# Patient Record
(demographics unavailable — no encounter records)

---

## 2025-02-06 NOTE — ASSESSMENT
[FreeTextEntry1] : JASON is a 3 year old girl presenting for sleep disordered breathing  Sleep Disordered Breathing - Discussed options for observation, medical management, sleep study or surgery.  - family would like to proceed with flonase trial and sleep study - offered T&A [facility] outpatient due to presumed symptom severity  left level 5 lymph node likely reactive 1x2cm mobile reassurance   speech  - reassurance   Education provided: Sleep disordered breathing may indicate presence of Obstructive Sleep Apnea. Obstructive sleep apnea is a condition where there are there is a cessation of breathing due to upper airway obstruction during sleep. It can be caused by a number of anatomic issues including, but not limited to tonsillar hypertrophy, increased weight, nasal obstruction and airway issues. There can be snoring, but this may be normal. Sleep apnea can be suggested by history, but a sleep study is needed to diagnose it. Options include observation and correction of the anatomic abnormality, weight loss if weight is contributory.   Sleep study If a sleep study was ordered, it will be used to evaluate for obstructive sleep apnea given history of snoring and other symptoms consistent with sleep-disordered breathing as mentioned above.   T&A Consent for Tonsillectomy and Adenoidectomy The risks, benefits and alternatives of tonsillectomy and adenoidectomy were discussed.   The risks of tonsillectomy include but are not limited to: bleeding, which can range from mild requiring observation to more serious or life-threatening bleeding necessitating hospitalization, blood transfusion, and/or return to the operating room for control; voice change, infection, pain, dehydration, swallowing difficulty, need for additional surgery, nasal regurgitation and risk of anesthesia (which will be discussed by the anesthesiologist). Benefits in the case of recurrent tonsillopharyngitis include a reduction (but not necessarily a complete cure) in the number of throat infections, and in the case of obstructive sleep apnea (MONTSERRAT) include a decrease in severity of MONTSERRAT, which can be curative, but in many cases residual MONTSERRAT may occur. Alternatives in the case of recurrent tonsillopharyngitis include observation and continued antibiotic treatment, and in the case of MONTSERRAT observation, medical therapy, Continuous Positive Airway Pressure(CPAP), Bilevel Positive Airway Pressure (BiPAP) other surgical options. Non-treatment of MONTSERRAT is associated with decreased sleep and its sequelae, and in severe cases can have cardiovascular complications.  The risks, benefits and alternatives of adenoidectomy were discussed. The risks include but are not limited to: bleeding, which can range from mild requiring observation to more serious necessitating hospitalization, blood transfusion, return to the operating room for control and in extreme cases death; voice change- specifically velopharyngeal insufficiency which can affect the nasal resonance; infection, pain, dehydration, swallowing difficulty, need for additional surgery, nasal regurgitation and risk of anesthesia (which will be discussed by the anesthesiologist). Benefits in the case of recurrent adenoiditis include a reduction (but not necessarily a complete cure) in the number of adenoid infections; in the case of nasal obstruction an improvement of nasal airway and decreased rhinorrhea; and in the case of obstructive sleep apnea (MONTSERRAT) include a decrease in severity of MONTSERRAT, which can be curative, but in many cases residual MONTSERRAT may occur. Alternatives in the case of recurrent adenoiditis include observation and continued antibiotic treatment; in the case of nasal obstruction observation or medical therapy including but not limited to antihistamines, intranasal/systemic steroids; and in the case of MONTSERRAT observation, medical therapy, Continuous Positive Airway Pressure(CPAP), Bilevel Positive Airway Pressure (BiPAP) other surgical options. Non-treatment of MONTSERRAT is associated with decreased sleep and its sequelae, and in severe cases can have cardiovascular complications.   Options/risks/benefits for intracapsular vs extracapsular tonsillectomy were discussed with the family.

## 2025-02-06 NOTE — HISTORY OF PRESENT ILLNESS
[Hyperactivity] : hyperactivity [No Personal or Family History of Easy Bruising, Bleeding, or Issues with General Anesthesia] : No Personal or Family History of easy bruising, bleeding, or issues with general anesthesia [de-identified] : Today I had the pleasure of seeing JASON ROBERTS for new patient evaluation.  JASON is a 3 year old girl who presents for: mouth breathing and snoring History was obtained from patient, father and grandmother and chart.   here for mouth breathing, snoring.  Snoring loudly going on for a long time, snoring most nights, stable, denies witnessed apneas/gasping, denies daytime fatigue chronic nasal congestion, frequent illness, denies daytime allergy symptoms other than legs ear infection only once two years  speech concerns, not forming full sentences, puts 2-4 words together, articulation is doing well, not in day care No past medical and surgical history. Born FT, vaginal delivery. Passed  hearing. No allergies.  Swollen lymph node on L side 1 year stable in size

## 2025-02-06 NOTE — PHYSICAL EXAM
[Effusion] : no effusion [Exposed Vessel] : left anterior vessel not exposed [4+] : 4+ [Increased Work of Breathing] : no increased work of breathing with use of accessory muscles and retractions [Normal Gait and Station] : normal gait and station [Normal muscle strength, symmetry and tone of facial, head and neck musculature] : normal muscle strength, symmetry and tone of facial, head and neck musculature [Normal] : no cervical lymphadenopathy [de-identified] : level 5 reactive lymph node 1x2cm

## 2025-02-06 NOTE — REASON FOR VISIT
[Initial Evaluation] : an initial evaluation for [Father] : father [Family Member] : family member [FreeTextEntry2] : mouth breathing, snoring

## 2025-05-08 NOTE — ASSESSMENT
[FreeTextEntry1] : JASON is a 3 year old girl presenting for sleep disordered breathing  Sleep Disordered Breathing - Discussed options for observation, medical management, sleep study or surgery.  - has sleep study scheduled for 6/1/25 - follow up after testing consider T&A  left level 5 lymph node likely reactive improving in size reassurance   Education provided: Sleep disordered breathing may indicate presence of Obstructive Sleep Apnea. Obstructive sleep apnea is a condition where there are there is a cessation of breathing due to upper airway obstruction during sleep. It can be caused by a number of anatomic issues including, but not limited to tonsillar hypertrophy, increased weight, nasal obstruction and airway issues. There can be snoring, but this may be normal. Sleep apnea can be suggested by history, but a sleep study is needed to diagnose it. Options include observation and correction of the anatomic abnormality, weight loss if weight is contributory.   Sleep study If a sleep study was ordered, it will be used to evaluate for obstructive sleep apnea given history of snoring and other symptoms consistent with sleep-disordered breathing as mentioned above.   T&A Consent for Tonsillectomy and Adenoidectomy The risks, benefits and alternatives of tonsillectomy and adenoidectomy were discussed.   The risks of tonsillectomy include but are not limited to: bleeding, which can range from mild requiring observation to more serious or life-threatening bleeding necessitating hospitalization, blood transfusion, and/or return to the operating room for control; voice change, infection, pain, dehydration, swallowing difficulty, need for additional surgery, nasal regurgitation and risk of anesthesia (which will be discussed by the anesthesiologist). Benefits in the case of recurrent tonsillopharyngitis include a reduction (but not necessarily a complete cure) in the number of throat infections, and in the case of obstructive sleep apnea (MONTSERRAT) include a decrease in severity of MONTSERRAT, which can be curative, but in many cases residual MONTSERRAT may occur. Alternatives in the case of recurrent tonsillopharyngitis include observation and continued antibiotic treatment, and in the case of MONTSERRAT observation, medical therapy, Continuous Positive Airway Pressure(CPAP), Bilevel Positive Airway Pressure (BiPAP) other surgical options. Non-treatment of MONTSERRAT is associated with decreased sleep and its sequelae, and in severe cases can have cardiovascular complications.  The risks, benefits and alternatives of adenoidectomy were discussed. The risks include but are not limited to: bleeding, which can range from mild requiring observation to more serious necessitating hospitalization, blood transfusion, return to the operating room for control and in extreme cases death; voice change- specifically velopharyngeal insufficiency which can affect the nasal resonance; infection, pain, dehydration, swallowing difficulty, need for additional surgery, nasal regurgitation and risk of anesthesia (which will be discussed by the anesthesiologist). Benefits in the case of recurrent adenoiditis include a reduction (but not necessarily a complete cure) in the number of adenoid infections; in the case of nasal obstruction an improvement of nasal airway and decreased rhinorrhea; and in the case of obstructive sleep apnea (MONTSERRAT) include a decrease in severity of MONTSERRAT, which can be curative, but in many cases residual MONTSERRAT may occur. Alternatives in the case of recurrent adenoiditis include observation and continued antibiotic treatment; in the case of nasal obstruction observation or medical therapy including but not limited to antihistamines, intranasal/systemic steroids; and in the case of MONTSERRAT observation, medical therapy, Continuous Positive Airway Pressure(CPAP), Bilevel Positive Airway Pressure (BiPAP) other surgical options. Non-treatment of MONTSERRAT is associated with decreased sleep and its sequelae, and in severe cases can have cardiovascular complications.   Options/risks/benefits for intracapsular vs extracapsular tonsillectomy were discussed with the family.

## 2025-05-08 NOTE — PHYSICAL EXAM
[Effusion] : no effusion [Exposed Vessel] : left anterior vessel not exposed [3+] : 3+ [Increased Work of Breathing] : no increased work of breathing with use of accessory muscles and retractions [Normal Gait and Station] : normal gait and station [Normal muscle strength, symmetry and tone of facial, head and neck musculature] : normal muscle strength, symmetry and tone of facial, head and neck musculature [Normal] : no cervical lymphadenopathy [de-identified] : level 5 reactive lymph node 1cm

## 2025-05-08 NOTE — HISTORY OF PRESENT ILLNESS
[Hyperactivity] : hyperactivity [No Personal or Family History of Easy Bruising, Bleeding, or Issues with General Anesthesia] : No Personal or Family History of easy bruising, bleeding, or issues with general anesthesia [de-identified] : Today I had the pleasure of seeing JASON ROBERTS for new patient evaluation.  JASON is a 3 year old girl who presents for: mouth breathing and snoring History was obtained from patient, mother and chart.  [de-identified] : Sleep study scheduled for 6/2 sleep improved with flonase, no longer using it speech improving

## 2025-05-08 NOTE — CONSULT LETTER
[Dear  ___] : Dear  [unfilled], [Courtesy Letter:] : I had the pleasure of seeing your patient, [unfilled], in my office today. [Please see my note below.] : Please see my note below. [Consult Closing:] : Thank you very much for allowing me to participate in the care of this patient.  If you have any questions, please do not hesitate to contact me. [Sincerely,] : Sincerely, [FreeTextEntry2] : Dr. Emily Watt [FreeTextEntry3] : Rosio Samano MD Pediatric Otolaryngology / Head and Neck Surgery    [facility] [address] [place] [address] Tel [phone] Fax [phone]    [address] [facility], [facility] [address] [place] [address]  Tel [phone] Fax [phone]